# Patient Record
Sex: MALE | Race: BLACK OR AFRICAN AMERICAN | NOT HISPANIC OR LATINO | Employment: FULL TIME | URBAN - METROPOLITAN AREA
[De-identification: names, ages, dates, MRNs, and addresses within clinical notes are randomized per-mention and may not be internally consistent; named-entity substitution may affect disease eponyms.]

---

## 2023-02-01 ENCOUNTER — HOSPITAL ENCOUNTER (EMERGENCY)
Facility: HOSPITAL | Age: 28
Discharge: HOME | End: 2023-02-01
Attending: EMERGENCY MEDICINE
Payer: MEDICAID

## 2023-02-01 VITALS
HEIGHT: 64 IN | HEART RATE: 65 BPM | OXYGEN SATURATION: 99 % | SYSTOLIC BLOOD PRESSURE: 129 MMHG | WEIGHT: 140 LBS | RESPIRATION RATE: 19 BRPM | TEMPERATURE: 98 F | DIASTOLIC BLOOD PRESSURE: 79 MMHG | BODY MASS INDEX: 23.9 KG/M2

## 2023-02-01 DIAGNOSIS — S46.812A STRAIN OF LEFT TRAPEZIUS MUSCLE, INITIAL ENCOUNTER: Primary | ICD-10-CM

## 2023-02-01 DIAGNOSIS — M25.512 SEVERE PAIN OF LEFT SHOULDER: ICD-10-CM

## 2023-02-01 PROCEDURE — 63600175 PHARM REV CODE 636 W HCPCS: Performed by: EMERGENCY MEDICINE

## 2023-02-01 PROCEDURE — 96372 THER/PROPH/DIAG INJ SC/IM: CPT | Performed by: EMERGENCY MEDICINE

## 2023-02-01 PROCEDURE — 25000003 PHARM REV CODE 250: Performed by: EMERGENCY MEDICINE

## 2023-02-01 PROCEDURE — 99285 EMERGENCY DEPT VISIT HI MDM: CPT | Mod: 25

## 2023-02-01 RX ORDER — TIZANIDINE 4 MG/1
4 TABLET ORAL EVERY 6 HOURS PRN
Qty: 30 TABLET | Refills: 0 | Status: SHIPPED | OUTPATIENT
Start: 2023-02-01 | End: 2023-02-11

## 2023-02-01 RX ORDER — ACETAMINOPHEN 500 MG
500 TABLET ORAL
Status: COMPLETED | OUTPATIENT
Start: 2023-02-01 | End: 2023-02-01

## 2023-02-01 RX ORDER — KETOROLAC TROMETHAMINE 30 MG/ML
30 INJECTION, SOLUTION INTRAMUSCULAR; INTRAVENOUS
Status: COMPLETED | OUTPATIENT
Start: 2023-02-01 | End: 2023-02-01

## 2023-02-01 RX ORDER — KETOROLAC TROMETHAMINE 10 MG/1
10 TABLET, FILM COATED ORAL EVERY 6 HOURS
Qty: 20 TABLET | Refills: 0 | Status: SHIPPED | OUTPATIENT
Start: 2023-02-01 | End: 2023-02-06

## 2023-02-01 RX ORDER — HYDROCODONE BITARTRATE AND ACETAMINOPHEN 5; 325 MG/1; MG/1
1 TABLET ORAL EVERY 6 HOURS PRN
Qty: 12 TABLET | Refills: 0 | Status: SHIPPED | OUTPATIENT
Start: 2023-02-01

## 2023-02-01 RX ORDER — TIZANIDINE 4 MG/1
4 TABLET ORAL
Status: COMPLETED | OUTPATIENT
Start: 2023-02-01 | End: 2023-02-01

## 2023-02-01 RX ORDER — LIDOCAINE 50 MG/G
1 PATCH TOPICAL DAILY
Qty: 30 PATCH | Refills: 0 | Status: SHIPPED | OUTPATIENT
Start: 2023-02-01

## 2023-02-01 RX ORDER — LIDOCAINE 50 MG/G
1 PATCH TOPICAL
Status: DISCONTINUED | OUTPATIENT
Start: 2023-02-01 | End: 2023-02-01 | Stop reason: HOSPADM

## 2023-02-01 RX ORDER — HYDROCODONE BITARTRATE AND ACETAMINOPHEN 10; 325 MG/1; MG/1
1 TABLET ORAL
Status: COMPLETED | OUTPATIENT
Start: 2023-02-01 | End: 2023-02-01

## 2023-02-01 RX ADMIN — LIDOCAINE 1 PATCH: 50 PATCH TOPICAL at 07:02

## 2023-02-01 RX ADMIN — HYDROCODONE BITARTRATE AND ACETAMINOPHEN 1 TABLET: 10; 325 TABLET ORAL at 07:02

## 2023-02-01 RX ADMIN — TIZANIDINE 4 MG: 4 TABLET ORAL at 06:02

## 2023-02-01 RX ADMIN — LIDOCAINE 1 PATCH: 50 PATCH TOPICAL at 06:02

## 2023-02-01 RX ADMIN — ACETAMINOPHEN 500 MG: 500 TABLET ORAL at 07:02

## 2023-02-01 RX ADMIN — KETOROLAC TROMETHAMINE 30 MG: 30 INJECTION, SOLUTION INTRAMUSCULAR at 06:02

## 2023-02-01 NOTE — Clinical Note
"Venkatesh Maher" Lejeune was seen and treated in our emergency department on 2/1/2023.  He may return to work on 02/06/2023.       If you have any questions or concerns, please don't hesitate to call.      SHARRI Gomes RN    "

## 2023-02-01 NOTE — ED PROVIDER NOTES
Encounter Date: 2/1/2023       History     Chief Complaint   Patient presents with    Shoulder Pain     States hurt his left shoulder after working out yesterday, able to lift arm above ear level in triage, no numbness/tingling     Otherwise healthy 27-year-old male presents to the emergency department for evaluation of left shoulder pain after working out yesterday.  He denies any specific injury, states that he initially felt sore but then woke up with severe pain early this morning.  Took ibuprofen around 2-3:00 a.m. without relief.  He denies any associated numbness, tingling, or weakness in the upper extremity but states pain worsens with movement which limits full range of motion.    The history is provided by the patient.   Shoulder Pain  This is a new problem. The current episode started yesterday. The problem occurs constantly. The problem has been gradually worsening. Pertinent negatives include no chest pain, no abdominal pain, no headaches and no shortness of breath. Exacerbated by: movement.   Review of patient's allergies indicates:  No Known Allergies  History reviewed. No pertinent past medical history.  History reviewed. No pertinent surgical history.  History reviewed. No pertinent family history.     Review of Systems   Respiratory:  Negative for shortness of breath.    Cardiovascular:  Negative for chest pain.   Gastrointestinal:  Negative for abdominal pain.   Musculoskeletal:  Positive for arthralgias and myalgias. Negative for neck pain and neck stiffness.   Skin:  Negative for wound.   Neurological:  Negative for weakness, numbness and headaches.     Physical Exam     Initial Vitals [02/01/23 0446]   BP Pulse Resp Temp SpO2   (!) 154/108 (!) 57 20 97.7 °F (36.5 °C) 98 %      MAP       --         Physical Exam    Nursing note and vitals reviewed.  Constitutional: He appears well-developed and well-nourished. No distress.   HENT:   Head: Normocephalic and atraumatic.   Mouth/Throat: Oropharynx  is clear and moist.   Eyes: Conjunctivae are normal. Pupils are equal, round, and reactive to light.   Neck: Neck supple.   No midline tenderness to palpation, no step-off deformity   Normal range of motion.  Cardiovascular:  Normal rate and regular rhythm.           2+ radial pulse left upper extremity   Pulmonary/Chest: No respiratory distress.   Musculoskeletal:      Cervical back: Normal range of motion and neck supple.      Comments: Palpable spasm/enlargement of the left trapezius muscle compared to the contralateral area.  Tender to palpation which reproduces complaint.  No gross deformity of the shoulder girdle bilaterally.  Pain with attempted flexion, abduction of the left upper extremity at the shoulder.  Tenderness over the anterior joint line     Neurological: He is alert and oriented to person, place, and time.   Skin: Skin is warm and dry. No rash and no abscess noted. No erythema.       ED Course   Procedures  Labs Reviewed - No data to display       Imaging Results              CT Shoulder Without Contrast Left (Final result)  Result time 02/01/23 10:15:36      Final result by Michael Leyva MD (02/01/23 10:15:36)                   Impression:      No fracture, dislocation, or other osseous injury seen to the included bones.    No full-thickness rotator cuff tear or effusion demonstrated.  Please note that unenhanced CT scan is a relatively low sensitivity study for detecting rotator cuff tear is and other soft tissue injuries and MRI may be considered for definitive evaluation if additional imaging is desired.      Electronically signed by: Michael Leyva  Date:    02/01/2023  Time:    10:15               Narrative:    EXAMINATION:  CT SHOULDER WITHOUT CONTRAST LEFT    CLINICAL HISTORY:  Shoulder pain, rotator cuff disorder suspected, xray done;    TECHNIQUE:  Unenhanced axial images the left shoulder were obtained along with coronal and sagittal reconstructions.  Automatic exposure control was  used to reduce radiation exposure to the patient.    COMPARISON:  Radiographs left shoulder used for comparison dated 02/01/2023.    FINDINGS:  The clavicle is intact. The sternoclavicular and acromioclavicular joints are preserved. The acromion, coracoid process, and scapula appear intact. The proximal humerus is intact. The glenohumeral joint is in good alignment.    The included portion of the left hemithorax is intact.  The subacromial space does not appear narrowed.  The study is not optimized to evaluate the rotator cuff tendons but no full-thickness tear is identified.  No glenohumeral joint effusion or articular erosion.  The long head biceps tendon is visible in the bicipital groove.  No axillary lymphadenopathy.  No atrophy of the rotator cuff muscles.  The included portion of the left lung is clear.                                       X-Ray Shoulder 2 or More Views Left (Final result)  Result time 02/01/23 12:59:00      Final result by Petros Rodriguez MD (02/01/23 12:59:00)                   Impression:      No acute findings.      Electronically signed by: Petros Rodriguez  Date:    02/01/2023  Time:    12:59               Narrative:    EXAMINATION:  XR SHOULDER COMPLETE 2 OR MORE VIEWS LEFT    CLINICAL HISTORY:  pain;    COMPARISON:  None    FINDINGS:  Three views of the left shoulder demonstrate no fracture or dislocation.                                    X-Rays:   Independently Interpreted Readings:   Other Readings:  X-ray left shoulder:  No acute fracture or subluxation.  Medications   ketorolac injection 30 mg (30 mg Intramuscular Given 2/1/23 0600)   tiZANidine tablet 4 mg (4 mg Oral Given 2/1/23 0600)   HYDROcodone-acetaminophen  mg per tablet 1 tablet (1 tablet Oral Given 2/1/23 0712)   acetaminophen tablet 500 mg (500 mg Oral Given 2/1/23 0712)   Medical Decision Making  Mr. Lejeune presented to the emergency department for evaluation of relatively atraumatic left shoulder pain, worsening  severity overnight, unimproved with oral NSAIDs at home.  Tender to palpation particularly over the trapezius muscle where there is significant muscle spasm appreciated; however, pain extends into the shoulder girdle and proximal left upper arm.  No gross deformity noted.    Ddx:   Anticipate associated muscle spasm/inflammatory changes related to recent workout; however, symptoms significantly limiting range of motion and utilization of the left upper extremity.  Will attempt pain control to see if this improves examination.  He denied any specific trauma, unlikely to have sustained a fracture however rotator cuff injury possible with heavy lifting as described with his workouts.  Additionally, shoulder impingement considered.  Reproducibility with palpation as well as range of motion along with lack of chest pain, exertional symptoms, shortness of breath, nausea or vomiting to suggest that this is an atypical presentation an acute cardiac event.    X-ray obtained demonstrated no acute abnormality; however, after initial attempted pain control with both topical analgesics, muscle relaxant, and NSAIDs, patient's pain remain uncontrolled and he remained unable to range the shoulder.  The glenohumeral association did appear possibly low lying on the x-ray though I believe this is just projectional and not reflective of a subluxation.  I did discuss with the patient as symptoms uncontrolled, additional imaging obtained to evaluate and ensure that he would not developed a significant effusion or obvious full-thickness internal soft tissue injury.  CT scan demonstrated no acute findings.  After additional, oral, opioid analgesics while patient's pain is improved and his range of motion has improved as well.  Instructed on shoulder range of motion exercises to prevent frozen shoulder.  Will prescribe symptomatic treatments to help with pain control at home including NSAIDs, topical analgesics, muscle relaxant and a short  course of oral opioids as needed for severe/breakthrough pain.  I informed the patient of the risks of opioid use and the option to fill fewer than prescribed amount. ED return precautions reviewed at the bedside and provided in the written discharge instructions. All questions answered to the best of my ability.         Problems Addressed:  Severe pain of left shoulder: acute illness or injury  Strain of left trapezius muscle, initial encounter: acute illness or injury    Amount and/or Complexity of Data Reviewed  External Data Reviewed: notes.  Radiology: ordered and independent interpretation performed. Decision-making details documented in ED Course.    Risk  Prescription drug management.      Amount and/or Complexity of Data Reviewed  none  External Data Reviewed: notes from previous ED visits and prescription medications  (see above for summary)  Risk and benefits of testing: discussed     Radiology: ordered and independent interpretation performed (see above or ED course)    Risk  Prescription drug management   Shared decision making     Critical Care  none                ED Course as of 02/02/23 0529   Wed Feb 01, 2023   0656 Patient reports still no improvement after medications administered.  Will re-attempt pain control.  Continues with trapezius muscle tenderness, limited range of motion of the shoulder due to pain however no bony deformity.  No acute osseous injuries appreciated on my interpretation of the x-ray. [KS]      ED Course User Index  [KS] Emiliana Tyler MD                 Clinical Impression:   Final diagnoses:  [S46.812A] Strain of left trapezius muscle, initial encounter (Primary)  [M25.512] Severe pain of left shoulder        ED Disposition Condition    Discharge Stable          ED Prescriptions       Medication Sig Dispense Start Date End Date Auth. Provider    ketorolac (TORADOL) 10 mg tablet Take 1 tablet (10 mg total) by mouth every 6 (six) hours. for 5 days 20 tablet 2/1/2023  2/6/2023 Emiliana Tyler MD    tiZANidine (ZANAFLEX) 4 MG tablet Take 1 tablet (4 mg total) by mouth every 6 (six) hours as needed (muscle spasm). 30 tablet 2/1/2023 2/11/2023 Emiliana Tyler MD    LIDOcaine (LIDODERM) 5 % Place 1 patch onto the skin once daily. Remove & Discard patch within 12 hours or as directed by MD 30 patch 2/1/2023 -- Emiliana Tyler MD    HYDROcodone-acetaminophen (NORCO) 5-325 mg per tablet Take 1 tablet by mouth every 6 (six) hours as needed for Pain (pain unrelieved by other medications prescribed). 12 tablet 2/1/2023 -- Emiliana Tyler MD          Follow-up Information       Follow up With Specialties Details Why Contact Info [facility] - Emergency Dept Emergency Medicine  As needed, If symptoms worsen [facility] [id number] [phone]    Your primary care physician  Schedule an appointment as soon as possible for a visit in 3 days               Emiliana Tyler MD  02/02/23 0553

## 2023-09-29 ENCOUNTER — HOSPITAL ENCOUNTER (EMERGENCY)
Facility: HOSPITAL | Age: 28
Discharge: HOME | End: 2023-09-29
Attending: STUDENT IN AN ORGANIZED HEALTH CARE EDUCATION/TRAINING PROGRAM
Payer: COMMERCIAL

## 2023-09-29 VITALS
HEART RATE: 59 BPM | DIASTOLIC BLOOD PRESSURE: 72 MMHG | OXYGEN SATURATION: 99 % | BODY MASS INDEX: 25.61 KG/M2 | TEMPERATURE: 98 F | WEIGHT: 150 LBS | RESPIRATION RATE: 18 BRPM | SYSTOLIC BLOOD PRESSURE: 136 MMHG | HEIGHT: 64 IN

## 2023-09-29 DIAGNOSIS — S05.02XA ABRASION OF LEFT CORNEA, INITIAL ENCOUNTER: Primary | ICD-10-CM

## 2023-09-29 DIAGNOSIS — H57.89 EYE IRRITATION: ICD-10-CM

## 2023-09-29 PROCEDURE — 99283 EMERGENCY DEPT VISIT LOW MDM: CPT

## 2023-09-29 PROCEDURE — 25000003 PHARM REV CODE 250: Performed by: STUDENT IN AN ORGANIZED HEALTH CARE EDUCATION/TRAINING PROGRAM

## 2023-09-29 RX ORDER — MOXIFLOXACIN 5 MG/ML
1 SOLUTION/ DROPS OPHTHALMIC
Status: DISCONTINUED | OUTPATIENT
Start: 2023-09-29 | End: 2023-09-29 | Stop reason: HOSPADM

## 2023-09-29 RX ORDER — MOXIFLOXACIN 5 MG/ML
1 SOLUTION/ DROPS OPHTHALMIC 3 TIMES DAILY
Qty: 3 ML | Refills: 0 | Status: SHIPPED | OUTPATIENT
Start: 2023-09-29

## 2023-09-29 RX ORDER — IBUPROFEN 600 MG/1
600 TABLET ORAL
Status: COMPLETED | OUTPATIENT
Start: 2023-09-29 | End: 2023-09-29

## 2023-09-29 RX ORDER — PROPARACAINE HYDROCHLORIDE 5 MG/ML
1 SOLUTION/ DROPS OPHTHALMIC
Status: COMPLETED | OUTPATIENT
Start: 2023-09-29 | End: 2023-09-29

## 2023-09-29 RX ADMIN — IBUPROFEN 600 MG: 600 TABLET, FILM COATED ORAL at 01:09

## 2023-09-29 RX ADMIN — PROPARACAINE HYDROCHLORIDE 1 DROP: 5 SOLUTION/ DROPS OPHTHALMIC at 11:09

## 2023-09-29 RX ADMIN — FLUORESCEIN SODIUM 1 EACH: 1 STRIP OPHTHALMIC at 11:09

## 2023-09-29 NOTE — ED PROVIDER NOTES
Encounter Date: 9/29/2023    SCRIBE #1 NOTE: I, Brian Rowe, am scribing for, and in the presence of,  Dr. Mcfarland. I have scribed the following portions of the note - Other sections scribed: HPI, ROS, Physical Exam, MDM, Attending.       History     Chief Complaint   Patient presents with    Eye Problem     Pt states had his contacts in for a week so took them out on Tuesday and since then has had redness and pain in both eyes, but mainly the left. No vision changes. Has some crust on his eyes in the morning. States it has gotten better, but not going away. Attempted to get appointment with eye doc, but said it wouldn't be for a while. 20/30 in right eye, 20/40 in left with glasses on.     26 y/o male presents to ED for L eye pain and redness onset 3 days ago, about an hour or two after taking out his contacts, which had been in for about a week straight.  Pt initially had bilateral pain and redness, but the pain in the R eye subsided with OTC eye drops recommended by his eye doctor's nurse.  Pt attempted to get appointment with eye doctor but could not be seen for a while.  He has not put his contacts back in since symptoms began.  He denies fever, chills or visual changes.    The history is provided by the patient.     Review of patient's allergies indicates:  No Known Allergies  No past medical history on file.  No past surgical history on file.  No family history on file.     Review of Systems   Constitutional:  Negative for chills and fever.   Eyes:  Positive for pain, redness and itching. Negative for visual disturbance.       Physical Exam     Initial Vitals [09/29/23 0811]   BP Pulse Resp Temp SpO2   136/72 (!) 59 18 97.9 °F (36.6 °C) 99 %      MAP       --         Physical Exam    Nursing note and vitals reviewed.  Constitutional: He appears well-developed. He is not diaphoretic. No distress.   HENT:   Head: Normocephalic and atraumatic.   Nose: Nose normal.   Mouth/Throat: Oropharynx is clear and moist.    Eyes: EOM are normal. Pupils are equal, round, and reactive to light.   Bilateral conjunctival injection; bilateral scleral injection    Cardiovascular:  Normal rate and regular rhythm.           No murmur heard.  Pulmonary/Chest: Breath sounds normal. No respiratory distress. He has no wheezes. He has no rales.   Abdominal: Abdomen is soft. He exhibits no distension. There is no abdominal tenderness.     Neurological: He is alert and oriented to person, place, and time. He has normal strength. No cranial nerve deficit.   Skin: Skin is warm. Capillary refill takes less than 2 seconds. No rash noted.         ED Course   Procedures  Labs Reviewed - No data to display       Imaging Results    None          Medications   fluorescein ophthalmic strip 1 each (1 each Both Eyes Given by Provider 9/29/23 1100)   proparacaine 0.5 % ophthalmic solution 1 drop (1 drop Both Eyes Given by Provider 9/29/23 1100)   ibuprofen tablet 600 mg (600 mg Oral Given 9/29/23 1315)     Medical Decision Making  Problems Addressed:  Abrasion of left cornea, initial encounter: acute illness or injury with systemic symptoms  Eye irritation: acute illness or injury    Risk  Prescription drug management.    Differential diagnosis (includes but is not limited to):   Corneal abrasion, corneal ulcer, globe rupture, uveitis, keratitis, endophthalmitis    MDM Narrative  27-year-old male presents for evaluation of I discomfort after having his contacts in for too long.  Fluorescein exam to be performed.  Pain and nausea control as needed.  Visual acuity to be performed.  Eye pressures to be performed once globe rupture ruled out.    Update:  Visual acuity performed.  Fluorescein exam reveals evidence of a small corneal abrasion.  I will cover the patient with antibiotic eyedrops and have him follow-up with ophthalmology.  Patient states his symptoms have significantly improved since arrival to the emergency department and medications given.  Patient is  ambulatory at his baseline with a steady gait and tolerating oral intake well.  Patient states he will follow up as we discussed.  Strict return precautions discussed and the patient has verbalized understanding.    Dispo:  Discharge    My independent radiology interpretation:   Point of care US (independently performed and interpreted):   Decision rules/clinical scoring:     Sepsis Perfusion Assessment:     Amount and/or Complexity of Data Reviewed  Independent historian: none   Summary of history:   External data reviewed: no prior records available for review   Summary of data reviewed:   Risk and benefits of testing: discussed         Discussion of management or test interpretation with external provider(s): none   Summary of discussion:     Risk  OTC medications  Prescription drug management   Shared decision making     Critical Care  none    Data Reviewed/Counseling: I have personally reviewed the patient's vital signs, nursing notes, and other relevant tests, information, and imaging. I had a detailed discussion regarding the historical points, exam findings, and any diagnostic results supporting the discharge diagnosis. I personally performed the history, PE, MDM and procedures as documented above and agree with the scribe's documentation.    Portions of this note were dictated using voice recognition software. Although it was reviewed for accuracy, some inherent voice recognition errors may have occurred and may be present in this document.          Scribe Attestation:   Scribe #1: I performed the above scribed service and the documentation accurately describes the services I performed. I attest to the accuracy of the note.    Attending Attestation:           Physician Attestation for Scribe:  Physician Attestation Statement for Scribe #1: I, reviewed documentation, as scribed by Brian Rowe in my presence, and it is both accurate and complete.                             Clinical Impression:   Final  diagnoses:  [S05.02XA] Abrasion of left cornea, initial encounter (Primary)  [H57.89] Eye irritation        ED Disposition Condition    Discharge Stable          ED Prescriptions       Medication Sig Dispense Start Date End Date Auth. Provider    moxifloxacin (VIGAMOX) 0.5 % ophthalmic solution Place 1 drop into the left eye 3 (three) times daily. 3 mL 9/29/2023 -- Aman Mcfarland MD          Follow-up Information       Follow up With Specialties Details Why Contact Info    Memo Rene II, MD Ophthalmology Schedule an appointment as soon as possible for a visit   [address] [facility] [id number]  [phone]      [facility]  Emergency Dept Emergency Medicine  If symptoms worsen [address] [facility] [id number] [phone]    [facility]  Schedule an appointment as soon as possible for a visit   [address] [facility] [id number] [phone]               Aman Mcfarland MD  10/10/23 0938

## 2023-09-29 NOTE — DISCHARGE INSTRUCTIONS
